# Patient Record
Sex: FEMALE | Race: BLACK OR AFRICAN AMERICAN
[De-identification: names, ages, dates, MRNs, and addresses within clinical notes are randomized per-mention and may not be internally consistent; named-entity substitution may affect disease eponyms.]

---

## 2019-01-14 ENCOUNTER — HOSPITAL ENCOUNTER (EMERGENCY)
Dept: HOSPITAL 18 - NAV ERS | Age: 84
LOS: 1 days | Discharge: HOME | End: 2019-01-15
Payer: MEDICARE

## 2019-01-14 DIAGNOSIS — Z79.899: ICD-10-CM

## 2019-01-14 DIAGNOSIS — R05: Primary | ICD-10-CM

## 2019-01-14 DIAGNOSIS — I10: ICD-10-CM

## 2019-01-14 DIAGNOSIS — Z79.82: ICD-10-CM

## 2019-01-14 DIAGNOSIS — R09.81: ICD-10-CM

## 2019-01-14 PROCEDURE — 85025 COMPLETE CBC W/AUTO DIFF WBC: CPT

## 2019-01-14 PROCEDURE — 71260 CT THORAX DX C+: CPT

## 2019-01-14 PROCEDURE — 93005 ELECTROCARDIOGRAM TRACING: CPT

## 2019-01-14 PROCEDURE — 80053 COMPREHEN METABOLIC PANEL: CPT

## 2019-01-14 PROCEDURE — 83880 ASSAY OF NATRIURETIC PEPTIDE: CPT

## 2019-01-14 PROCEDURE — 84484 ASSAY OF TROPONIN QUANT: CPT

## 2019-01-15 LAB
ALBUMIN SERPL BCG-MCNC: 4 G/DL (ref 3.4–4.8)
ALP SERPL-CCNC: 88 U/L (ref 40–150)
ALT SERPL W P-5'-P-CCNC: 17 U/L (ref 8–55)
ANION GAP SERPL CALC-SCNC: 13 MMOL/L (ref 10–20)
AST SERPL-CCNC: 16 U/L (ref 5–34)
BASOPHILS # BLD AUTO: 0.1 THOU/UL (ref 0–0.2)
BASOPHILS NFR BLD AUTO: 0.7 % (ref 0–1)
BILIRUB SERPL-MCNC: 0.4 MG/DL (ref 0.2–1.2)
BUN SERPL-MCNC: 15 MG/DL (ref 9.8–20.1)
CALCIUM SERPL-MCNC: 10.4 MG/DL (ref 7.8–10.44)
CHLORIDE SERPL-SCNC: 99 MMOL/L (ref 98–107)
CO2 SERPL-SCNC: 27 MMOL/L (ref 23–31)
CREAT CL PREDICTED SERPL C-G-VRATE: 0 ML/MIN (ref 70–130)
EOSINOPHIL # BLD AUTO: 0 THOU/UL (ref 0–0.7)
EOSINOPHIL NFR BLD AUTO: 0.3 % (ref 0–10)
GLOBULIN SER CALC-MCNC: 3.9 G/DL (ref 2.4–3.5)
GLUCOSE SERPL-MCNC: 109 MG/DL (ref 83–110)
HGB BLD-MCNC: 12.9 G/DL (ref 12–16)
LYMPHOCYTES # BLD AUTO: 1.2 THOU/UL (ref 1.2–3.4)
LYMPHOCYTES NFR BLD AUTO: 17.7 % (ref 21–51)
MCH RBC QN AUTO: 26.2 PG (ref 27–31)
MCV RBC AUTO: 85.2 FL (ref 78–98)
MONOCYTES # BLD AUTO: 0.7 THOU/UL (ref 0.11–0.59)
MONOCYTES NFR BLD AUTO: 10.2 % (ref 0–10)
NEUTROPHILS # BLD AUTO: 4.9 THOU/UL (ref 1.4–6.5)
NEUTROPHILS NFR BLD AUTO: 71 % (ref 42–75)
PLATELET # BLD AUTO: 296 THOU/UL (ref 130–400)
POTASSIUM SERPL-SCNC: 4 MMOL/L (ref 3.5–5.1)
RBC # BLD AUTO: 4.94 MILL/UL (ref 4.2–5.4)
SODIUM SERPL-SCNC: 135 MMOL/L (ref 136–145)
WBC # BLD AUTO: 7 THOU/UL (ref 4.8–10.8)

## 2019-01-15 NOTE — CT
PRELIMINARY REPORT/[facility]/EMERGENTY AFTER-HOURS PROCEDURE 

 

CT Chest With Contrast

 

EXAM DATE/TIME:

1/15/2019 1:27 AM

 

CLINICAL HISTORY:

83 years old, female; Signs and symptoms; Shortness of breath and other: Choking sensation; Patient H
X: C/O cough and congestion for the past month, after having esophagus dilated. States the phlegm cut
s the air off. ; Additional info: R/O esophageal mass

 

TECHNIQUE:

Axial computed tomography images of the chest with intravenous contrast.

All CT scans at this facility use at least one of these dose optimization techniques: automated expos
ure control; mA and/or kV adjustment per patient size (includes targeted exams where dose is matched 
to clinical indication); or iterative reconstruction. Coronal and sagittal reformatted images were cr
eated and reviewed.

 

CONTRAST:

96 ml of ISOVUE 370 administered intravenously.

 

COMPARISON:

No relevant prior studies available.

 

FINDINGS:

Thyroid: The thyroid gland is normal.

Lungs: There is a pulmonary parenchymal calcification consistent with remote granulomatous organism e
xposure. There is atelectasis at the LEFT lung base.

Pleural space: Normal. No pneumothorax. No pleural effusion.

Heart: The cardiac structures are normal.

Mediastinum: The trachea is normal. A small hiatal hernia is present. No CT evidence of obstructive e
sophageal mass.

Aorta: Normal. No aortic aneurysm.

Lymph nodes: Unremarkable. No enlarged lymph nodes.

Bones/joints: The spine demonstrates mild to moderate degenerative changes at multiple levels.

Soft tissues: Unremarkable.

Upper abdomen: There is LEFT hemidiaphragm elevation.

 

IMPRESSION:

No CT evidence of obstructive esophageal mass. Fluoroscopy swallow exam may be performed if clinicall
y warranted.

 

Thank you for allowing us to participate in the care of your patient.

Dictated and Authenticated by: Michael Marie MD

01/15/2019 2:28 AM Central Time ([place] & [place])

 

 

FINAL REPORT 

CT CHEST WITH IV CONTRAST PERFORMED ON AN EMERGENCY BASIS: 

 

Date:  01/15/19 

Time:  0134 hours

 

HISTORY:  

Chest pain after esophageal dilatation. 

 

FINDINGS:

Findings agree with the preliminary report by [facility]. No evidence of esophageal rupture or mass/obstruc
tion. 

 

Calcified granulomata are consistent with healed granulomatous disease. Atherosclerosis. 

 

 

POS: [facility]

## 2019-02-18 ENCOUNTER — HOSPITAL ENCOUNTER (EMERGENCY)
Dept: HOSPITAL 18 - NAV ERS | Age: 84
Discharge: HOME | End: 2019-02-18
Payer: MEDICARE

## 2019-02-18 DIAGNOSIS — Z79.82: ICD-10-CM

## 2019-02-18 DIAGNOSIS — R53.1: Primary | ICD-10-CM

## 2019-02-18 DIAGNOSIS — I10: ICD-10-CM

## 2019-02-18 DIAGNOSIS — K21.9: ICD-10-CM

## 2019-02-18 DIAGNOSIS — Z79.899: ICD-10-CM

## 2019-02-18 LAB
ALBUMIN SERPL BCG-MCNC: 3.8 G/DL (ref 3.4–4.8)
ALP SERPL-CCNC: 79 U/L (ref 40–150)
ALT SERPL W P-5'-P-CCNC: 18 U/L (ref 8–55)
ANION GAP SERPL CALC-SCNC: 12 MMOL/L (ref 10–20)
AST SERPL-CCNC: 19 U/L (ref 5–34)
BASOPHILS # BLD AUTO: 0.1 THOU/UL (ref 0–0.2)
BASOPHILS NFR BLD AUTO: 0.9 % (ref 0–1)
BILIRUB SERPL-MCNC: 0.5 MG/DL (ref 0.2–1.2)
BUN SERPL-MCNC: 9 MG/DL (ref 9.8–20.1)
CALCIUM SERPL-MCNC: 9.9 MG/DL (ref 7.8–10.44)
CHLORIDE SERPL-SCNC: 104 MMOL/L (ref 98–107)
CK SERPL-CCNC: 103 U/L (ref 29–168)
CO2 SERPL-SCNC: 25 MMOL/L (ref 23–31)
CREAT CL PREDICTED SERPL C-G-VRATE: 0 ML/MIN (ref 70–130)
EOSINOPHIL # BLD AUTO: 0 THOU/UL (ref 0–0.7)
EOSINOPHIL NFR BLD AUTO: 0.5 % (ref 0–10)
GLOBULIN SER CALC-MCNC: 3.1 G/DL (ref 2.4–3.5)
GLUCOSE SERPL-MCNC: 101 MG/DL (ref 83–110)
HGB BLD-MCNC: 12.3 G/DL (ref 12–16)
LYMPHOCYTES # BLD AUTO: 1.1 THOU/UL (ref 1.2–3.4)
LYMPHOCYTES NFR BLD AUTO: 18.2 % (ref 21–51)
MCH RBC QN AUTO: 26.9 PG (ref 27–31)
MCV RBC AUTO: 85.7 FL (ref 78–98)
MONOCYTES # BLD AUTO: 0.6 THOU/UL (ref 0.11–0.59)
MONOCYTES NFR BLD AUTO: 9.6 % (ref 0–10)
NEUTROPHILS # BLD AUTO: 4.3 THOU/UL (ref 1.4–6.5)
NEUTROPHILS NFR BLD AUTO: 70.7 % (ref 42–75)
PLATELET # BLD AUTO: 317 THOU/UL (ref 130–400)
POTASSIUM SERPL-SCNC: 4.6 MMOL/L (ref 3.5–5.1)
RBC # BLD AUTO: 4.59 MILL/UL (ref 4.2–5.4)
SODIUM SERPL-SCNC: 136 MMOL/L (ref 136–145)
SP GR UR STRIP: 1.01 (ref 1–1.03)
WBC # BLD AUTO: 6 THOU/UL (ref 4.8–10.8)

## 2019-02-18 PROCEDURE — 82550 ASSAY OF CK (CPK): CPT

## 2019-02-18 PROCEDURE — 84484 ASSAY OF TROPONIN QUANT: CPT

## 2019-02-18 PROCEDURE — 93005 ELECTROCARDIOGRAM TRACING: CPT

## 2019-02-18 PROCEDURE — 71045 X-RAY EXAM CHEST 1 VIEW: CPT

## 2019-02-18 PROCEDURE — 85025 COMPLETE CBC W/AUTO DIFF WBC: CPT

## 2019-02-18 PROCEDURE — 80053 COMPREHEN METABOLIC PANEL: CPT

## 2019-02-18 PROCEDURE — 81003 URINALYSIS AUTO W/O SCOPE: CPT

## 2019-02-18 NOTE — RAD
PORTABLE CHEST 1 VIEW:

 

Date:  02/18/19 

Time:  1321 hours

 

HISTORY:  

Syncope. 

 

FINDINGS:

 

Comparison made with exam of 12/22/15. 

 

The heart size is prominent, but stable. No focal areas of consolidation, pneumothoraces, paige pulmo
nary edema, or pleural effusions are seen. 

 

IMPRESSION: 

No acute process. 

 

 

 

POS: [facility]